# Patient Record
Sex: FEMALE | Race: WHITE | NOT HISPANIC OR LATINO | URBAN - METROPOLITAN AREA
[De-identification: names, ages, dates, MRNs, and addresses within clinical notes are randomized per-mention and may not be internally consistent; named-entity substitution may affect disease eponyms.]

---

## 2018-02-20 ENCOUNTER — OUTPATIENT (OUTPATIENT)
Dept: OUTPATIENT SERVICES | Facility: HOSPITAL | Age: 33
LOS: 1 days | Discharge: HOME | End: 2018-02-20

## 2018-02-20 DIAGNOSIS — N89.8 OTHER SPECIFIED NONINFLAMMATORY DISORDERS OF VAGINA: ICD-10-CM

## 2018-03-16 ENCOUNTER — INPATIENT (INPATIENT)
Facility: HOSPITAL | Age: 33
LOS: 1 days | Discharge: HOME | End: 2018-03-18
Attending: OBSTETRICS & GYNECOLOGY | Admitting: OBSTETRICS & GYNECOLOGY

## 2018-03-16 VITALS — SYSTOLIC BLOOD PRESSURE: 143 MMHG | HEART RATE: 83 BPM | DIASTOLIC BLOOD PRESSURE: 86 MMHG

## 2018-03-16 LAB
AMPHET UR-MCNC: NEGATIVE — SIGNIFICANT CHANGE UP
APPEARANCE UR: (no result)
BACTERIA # UR AUTO: (no result) /HPF
BARBITURATES UR SCN-MCNC: NEGATIVE — SIGNIFICANT CHANGE UP
BASOPHILS # BLD AUTO: 0.02 K/UL — SIGNIFICANT CHANGE UP (ref 0–0.2)
BASOPHILS NFR BLD AUTO: 0.2 % — SIGNIFICANT CHANGE UP (ref 0–1)
BENZODIAZ UR-MCNC: NEGATIVE — SIGNIFICANT CHANGE UP
BILIRUB UR-MCNC: NEGATIVE — SIGNIFICANT CHANGE UP
BLD GP AB SCN SERPL QL: SIGNIFICANT CHANGE UP
COCAINE METAB.OTHER UR-MCNC: NEGATIVE — SIGNIFICANT CHANGE UP
COLOR SPEC: YELLOW — SIGNIFICANT CHANGE UP
DIFF PNL FLD: (no result)
DRUG SCREEN 1, URINE RESULT: SIGNIFICANT CHANGE UP
EOSINOPHIL # BLD AUTO: 0.05 K/UL — SIGNIFICANT CHANGE UP (ref 0–0.7)
EOSINOPHIL NFR BLD AUTO: 0.6 % — SIGNIFICANT CHANGE UP (ref 0–8)
EPI CELLS # UR: (no result) /HPF
GLUCOSE UR QL: NEGATIVE MG/DL — SIGNIFICANT CHANGE UP
HCT VFR BLD CALC: 37.9 % — SIGNIFICANT CHANGE UP (ref 37–47)
HGB BLD-MCNC: 12.8 G/DL — SIGNIFICANT CHANGE UP (ref 12–16)
IMM GRANULOCYTES NFR BLD AUTO: 0.7 % — HIGH (ref 0.1–0.3)
KETONES UR-MCNC: NEGATIVE — SIGNIFICANT CHANGE UP
LEUKOCYTE ESTERASE UR-ACNC: (no result)
LYMPHOCYTES # BLD AUTO: 1.35 K/UL — SIGNIFICANT CHANGE UP (ref 1.2–3.4)
LYMPHOCYTES # BLD AUTO: 15.4 % — LOW (ref 20.5–51.1)
MCHC RBC-ENTMCNC: 29.2 PG — SIGNIFICANT CHANGE UP (ref 27–31)
MCHC RBC-ENTMCNC: 33.8 G/DL — SIGNIFICANT CHANGE UP (ref 32–37)
MCV RBC AUTO: 86.5 FL — SIGNIFICANT CHANGE UP (ref 81–99)
METHADONE UR-MCNC: NEGATIVE — SIGNIFICANT CHANGE UP
MONOCYTES # BLD AUTO: 0.37 K/UL — SIGNIFICANT CHANGE UP (ref 0.1–0.6)
MONOCYTES NFR BLD AUTO: 4.2 % — SIGNIFICANT CHANGE UP (ref 1.7–9.3)
NEUTROPHILS # BLD AUTO: 6.9 K/UL — HIGH (ref 1.4–6.5)
NEUTROPHILS NFR BLD AUTO: 78.9 % — HIGH (ref 42.2–75.2)
NITRITE UR-MCNC: NEGATIVE — SIGNIFICANT CHANGE UP
NRBC # BLD: 0 /100 WBCS — SIGNIFICANT CHANGE UP (ref 0–0)
OPIATES UR-MCNC: NEGATIVE — SIGNIFICANT CHANGE UP
PCP UR-MCNC: NEGATIVE — SIGNIFICANT CHANGE UP
PH UR: 6 — SIGNIFICANT CHANGE UP (ref 5–8)
PLATELET # BLD AUTO: 217 K/UL — SIGNIFICANT CHANGE UP (ref 130–400)
PROPOXYPHENE QUALITATIVE URINE RESULT: NEGATIVE — SIGNIFICANT CHANGE UP
PROT UR-MCNC: (no result) MG/DL
RBC # BLD: 4.38 M/UL — SIGNIFICANT CHANGE UP (ref 4.2–5.4)
RBC # FLD: 14.6 % — HIGH (ref 11.5–14.5)
RBC CASTS # UR COMP ASSIST: SIGNIFICANT CHANGE UP /HPF
SP GR SPEC: 1.02 — SIGNIFICANT CHANGE UP (ref 1.01–1.03)
THC UR QL: NEGATIVE — SIGNIFICANT CHANGE UP
TYPE + AB SCN PNL BLD: SIGNIFICANT CHANGE UP
UROBILINOGEN FLD QL: 1 MG/DL (ref 0.2–0.2)
WBC # BLD: 8.75 K/UL — SIGNIFICANT CHANGE UP (ref 4.8–10.8)
WBC # FLD AUTO: 8.75 K/UL — SIGNIFICANT CHANGE UP (ref 4.8–10.8)
WBC UR QL: >50 /HPF

## 2018-03-16 RX ORDER — OXYTOCIN 10 UNIT/ML
333.33 VIAL (ML) INJECTION
Qty: 20 | Refills: 0 | Status: DISCONTINUED | OUTPATIENT
Start: 2018-03-16 | End: 2018-03-17

## 2018-03-16 RX ORDER — SODIUM CHLORIDE 9 MG/ML
500 INJECTION, SOLUTION INTRAVENOUS ONCE
Qty: 0 | Refills: 0 | Status: DISCONTINUED | OUTPATIENT
Start: 2018-03-16 | End: 2018-03-17

## 2018-03-16 RX ORDER — OXYTOCIN 10 UNIT/ML
2 VIAL (ML) INJECTION
Qty: 30 | Refills: 0 | Status: DISCONTINUED | OUTPATIENT
Start: 2018-03-16 | End: 2018-03-17

## 2018-03-16 RX ORDER — METHYLDOPA 250 MG
1 TABLET ORAL
Qty: 0 | Refills: 0 | COMMUNITY

## 2018-03-16 RX ORDER — NALOXONE HYDROCHLORIDE 4 MG/.1ML
0.1 SPRAY NASAL
Qty: 0 | Refills: 0 | Status: CANCELLED | OUTPATIENT
Start: 2018-03-16 | End: 2018-03-18

## 2018-03-16 RX ORDER — SODIUM CHLORIDE 9 MG/ML
1000 INJECTION, SOLUTION INTRAVENOUS
Qty: 0 | Refills: 0 | Status: DISCONTINUED | OUTPATIENT
Start: 2018-03-16 | End: 2018-03-17

## 2018-03-16 RX ORDER — ONDANSETRON 8 MG/1
4 TABLET, FILM COATED ORAL EVERY 6 HOURS
Qty: 0 | Refills: 0 | Status: CANCELLED | OUTPATIENT
Start: 2018-03-16 | End: 2018-03-18

## 2018-03-16 RX ORDER — METHYLDOPA 250 MG
250 TABLET ORAL
Qty: 0 | Refills: 0 | Status: DISCONTINUED | OUTPATIENT
Start: 2018-03-16 | End: 2018-03-18

## 2018-03-16 RX ADMIN — Medication 2 MILLIUNIT(S)/MIN: at 11:54

## 2018-03-16 NOTE — OB PROVIDER DELIVERY SUMMARY - NSPROVIDERDELIVERYNOTE_OBGYN_ALL_OB_FT
Pt became fully dilated and pushed well over intact perineum, delivery of head in GEOVANY position ,nose and mouth bulb suctioned on perineum, followed by delivery of shoulders and body without difficulty, live female infant, APGARs 9/9, 3160gms, 3-vessel cord + placenta complete, 2nd degree perineal laceration repaired with chromic, no complications, CBR collected

## 2018-03-16 NOTE — OB PROVIDER H&P - NSHPLABSRESULTS_GEN_ALL_CORE
GTT 85/192/158/118  HbA1c 5.0% on 12/20/18    negative sequential screen  NIPTs 46xx    35w4d GA, cephalic, 3 vessel cord, ant placenta, WM 52mm, BPP 8/8, s/d ratio 2.3  34w4d GA cephalic, 3 vessel cord, ant placenta, MVP 57mm, EFW 2380g, 41%ile, normal anatomy, BPP 8/8, s/d ratio 2.4, MVP 57mm  32w4d GA breech, EFW 1840g,37%ile, 3 vessel cord, ant placenta, MVP 56mm, normal anatomy, BPP 8/8, s/d ratio 2.0  30w5d GA EFW 1622g (49%ile), breech, 3 vessel cord, ant placenta MVP 57mm, normal anatomy, BPP 8/8, s/d ratio 1.9  28w5d GA cephalic, 1328g (55%ile), 3 vessel cord, ant placenta, MVP 48mm, normal anatomic survey, BPP 8/8, MVP 48mm, s/d ratio 2.2  18w5d GA EFW 267g, transverse, 3 vessel cord, ant placenta, CL 42mm, normal anatomic survey  12w5d GA crl 64.55, nl ovaries, CL 42mm

## 2018-03-16 NOTE — OB PROVIDER H&P - ATTENDING COMMENTS
IMP: IUP @ 38 wks, Gest HTN on Aldomet, GDMA1, favorable cervix    Plan: Admit, Pitocin Induction, Pain Management, Anticipated

## 2018-03-16 NOTE — OB PROVIDER H&P - ASSESSMENT
33yo  at 38w0d GA, PIH HTN on aldomet, GDMA1, for IOL,  -admit to L&D  -continuous EFM/toco  -pain mgmt prn  -admission labs  -IV fluids  -pitocin for induction    Dr Linares aware

## 2018-03-16 NOTE — OB PROVIDER H&P - HISTORY OF PRESENT ILLNESS
33yo  at 38w0d GA presents for scheduled IOL.  Pt denies ctx, LOF, vaginal bleeding.  Reports good fetal movement.  Pt GDMA1, well controlled with diet.  Pt has h/o chronic HTN, on aldomet.  Last VE /-2 per PMD. 31yo  at 38w0d GA presents for scheduled IOL.  Pt denies ctx, LOF, vaginal bleeding.  Reports good fetal movement.  Pt GDMA1, well controlled with diet.  Pt has h/o cHTN, on aldomet, had PELs that were negative.  Denies headaches, vision changes, RUQ/epigastric pain, increased swelling of extremities.  Last VE 2/-2 per PMD.

## 2018-03-17 LAB
HCT VFR BLD CALC: 36.3 % — LOW (ref 37–47)
HGB BLD-MCNC: 12.1 G/DL — SIGNIFICANT CHANGE UP (ref 12–16)
MCHC RBC-ENTMCNC: 28.9 PG — SIGNIFICANT CHANGE UP (ref 27–31)
MCHC RBC-ENTMCNC: 33.3 G/DL — SIGNIFICANT CHANGE UP (ref 32–37)
MCV RBC AUTO: 86.6 FL — SIGNIFICANT CHANGE UP (ref 81–99)
NRBC # BLD: 0 /100 WBCS — SIGNIFICANT CHANGE UP (ref 0–0)
PLATELET # BLD AUTO: 196 K/UL — SIGNIFICANT CHANGE UP (ref 130–400)
RBC # BLD: 4.19 M/UL — LOW (ref 4.2–5.4)
RBC # FLD: 14.6 % — HIGH (ref 11.5–14.5)
T PALLIDUM AB TITR SER: NEGATIVE — SIGNIFICANT CHANGE UP
WBC # BLD: 13.05 K/UL — HIGH (ref 4.8–10.8)
WBC # FLD AUTO: 13.05 K/UL — HIGH (ref 4.8–10.8)

## 2018-03-17 RX ORDER — SIMETHICONE 80 MG/1
80 TABLET, CHEWABLE ORAL EVERY 6 HOURS
Qty: 0 | Refills: 0 | Status: DISCONTINUED | OUTPATIENT
Start: 2018-03-17 | End: 2018-03-18

## 2018-03-17 RX ORDER — MAGNESIUM HYDROXIDE 400 MG/1
30 TABLET, CHEWABLE ORAL
Qty: 0 | Refills: 0 | Status: DISCONTINUED | OUTPATIENT
Start: 2018-03-17 | End: 2018-03-18

## 2018-03-17 RX ORDER — OXYCODONE AND ACETAMINOPHEN 5; 325 MG/1; MG/1
1 TABLET ORAL EVERY 6 HOURS
Qty: 0 | Refills: 0 | Status: DISCONTINUED | OUTPATIENT
Start: 2018-03-17 | End: 2018-03-18

## 2018-03-17 RX ORDER — LANOLIN
1 OINTMENT (GRAM) TOPICAL EVERY 6 HOURS
Qty: 0 | Refills: 0 | Status: DISCONTINUED | OUTPATIENT
Start: 2018-03-17 | End: 2018-03-18

## 2018-03-17 RX ORDER — OXYTOCIN 10 UNIT/ML
41.67 VIAL (ML) INJECTION
Qty: 20 | Refills: 0 | Status: DISCONTINUED | OUTPATIENT
Start: 2018-03-17 | End: 2018-03-18

## 2018-03-17 RX ORDER — DOCUSATE SODIUM 100 MG
100 CAPSULE ORAL
Qty: 0 | Refills: 0 | Status: DISCONTINUED | OUTPATIENT
Start: 2018-03-17 | End: 2018-03-18

## 2018-03-17 RX ORDER — IBUPROFEN 200 MG
600 TABLET ORAL EVERY 6 HOURS
Qty: 0 | Refills: 0 | Status: DISCONTINUED | OUTPATIENT
Start: 2018-03-17 | End: 2018-03-18

## 2018-03-17 RX ORDER — AER TRAVELER 0.5 G/1
1 SOLUTION RECTAL; TOPICAL EVERY 4 HOURS
Qty: 0 | Refills: 0 | Status: DISCONTINUED | OUTPATIENT
Start: 2018-03-17 | End: 2018-03-18

## 2018-03-17 RX ORDER — DIPHENHYDRAMINE HCL 50 MG
25 CAPSULE ORAL EVERY 6 HOURS
Qty: 0 | Refills: 0 | Status: DISCONTINUED | OUTPATIENT
Start: 2018-03-17 | End: 2018-03-18

## 2018-03-17 RX ORDER — DIBUCAINE 1 %
1 OINTMENT (GRAM) RECTAL EVERY 4 HOURS
Qty: 0 | Refills: 0 | Status: DISCONTINUED | OUTPATIENT
Start: 2018-03-17 | End: 2018-03-18

## 2018-03-17 RX ORDER — ACETAMINOPHEN 500 MG
650 TABLET ORAL EVERY 6 HOURS
Qty: 0 | Refills: 0 | Status: DISCONTINUED | OUTPATIENT
Start: 2018-03-17 | End: 2018-03-18

## 2018-03-17 RX ADMIN — Medication 100 MILLIGRAM(S): at 17:06

## 2018-03-17 RX ADMIN — Medication 600 MILLIGRAM(S): at 01:49

## 2018-03-17 RX ADMIN — Medication 600 MILLIGRAM(S): at 23:24

## 2018-03-17 RX ADMIN — Medication 600 MILLIGRAM(S): at 18:00

## 2018-03-17 RX ADMIN — Medication 250 MILLIGRAM(S): at 06:16

## 2018-03-17 RX ADMIN — Medication 600 MILLIGRAM(S): at 06:17

## 2018-03-17 RX ADMIN — Medication 250 MILLIGRAM(S): at 17:07

## 2018-03-17 RX ADMIN — Medication 600 MILLIGRAM(S): at 23:54

## 2018-03-17 RX ADMIN — Medication 600 MILLIGRAM(S): at 17:07

## 2018-03-17 NOTE — PROGRESS NOTE ADULT - ASSESSMENT
IMP: s/p  - PPD 1 - Doing Well    Plan:  f/u CBC, Anticipated d/c - 3/18 IMP: s/p  - PPD 1 - Doing Well         Gest HTN on Aldomet, GDMA1    Plan:  f/u CBC, Continue Aldomet, Anticipated d/c - 3/18

## 2018-03-18 VITALS
SYSTOLIC BLOOD PRESSURE: 133 MMHG | HEART RATE: 73 BPM | RESPIRATION RATE: 20 BRPM | TEMPERATURE: 97 F | DIASTOLIC BLOOD PRESSURE: 80 MMHG

## 2018-03-18 RX ORDER — IBUPROFEN 200 MG
1 TABLET ORAL
Qty: 0 | Refills: 0 | COMMUNITY
Start: 2018-03-18

## 2018-03-18 RX ADMIN — Medication 600 MILLIGRAM(S): at 13:46

## 2018-03-18 RX ADMIN — Medication 250 MILLIGRAM(S): at 05:18

## 2018-03-18 NOTE — DISCHARGE NOTE OB - PLAN OF CARE
healthy patient uncomplicated vaginal delivery and postpartum course  Nothing in the vagina for 6 weeks. No tampons, no douching, no sex. No swimming, no tub-baths. May shower.If fever 100.4F or greater, excessive vaginal bleeding, or severe abdominal pain, call your Ob/Gyn or come to ED or call 911.

## 2018-03-18 NOTE — DISCHARGE NOTE OB - CARE PROVIDER_API CALL
Bryant Linares), Obstetrics and Gynecology  [address] [place] [address]  Phone: [phone]  Fax: [phone]

## 2018-03-18 NOTE — DISCHARGE NOTE OB - CARE PLAN
Principal Discharge DX:	Vaginal delivery  Goal:	healthy patient  Assessment and plan of treatment:	uncomplicated vaginal delivery and postpartum course  Nothing in the vagina for 6 weeks. No tampons, no douching, no sex. No swimming, no tub-baths. May shower.If fever 100.4F or greater, excessive vaginal bleeding, or severe abdominal pain, call your Ob/Gyn or come to ED or call 911.

## 2018-03-18 NOTE — DISCHARGE NOTE OB - MEDICATION SUMMARY - MEDICATIONS TO TAKE
I will START or STAY ON the medications listed below when I get home from the hospital:    ibuprofen 600 mg oral tablet  -- 1 tab(s) by mouth every 6 hours, As needed, Moderate Pain  -- Indication: For INDUCTION    Aldomet 250 mg oral tablet  -- 1 cap(s) by mouth 2 times a day  -- Indication: For Chronic hypertension

## 2018-03-18 NOTE — PROGRESS NOTE ADULT - SUBJECTIVE AND OBJECTIVE BOX
PGY2 note    Pt seen and examined at bedside, no complaints at this time.    T(F): 97.6  HR: 91  BP: 114/79  SpO2: --    EFM: 120/mod/+accels  [place]: q3-5    SVE: deferred, last exam /-2 @10:25    Meds:  11:56 started on pitocin, now at 10mu  13:31 epidural      Labs:                        12.8   8.75  )-----------( 217      ( 11:19 )             37.9     Urinalysis Basic - ( 16 Mar 2018 10:23 )    Color: Yellow / Appearance: Cloudy / S.020 / pH: x  Gluc: x / Ketone: Negative  / Bili: Negative / Urobili: 1.0 mg/dL   Blood: x / Protein: Trace mg/dL / Nitrite: Negative   Leuk Esterase: Large / RBC: 1-2 /HPF / WBC >50 /HPF   Sq Epi: x / Non Sq Epi: Many /HPF / Bacteria: Moderate /HPF    31yo  at 38w0d GA, GBS neg, scheduled IOL for cHTN and GDMA1 well controlled, on pitocin, with epidural,  -continuous EMF/toco  -pain mgmt prn  -c/w pitocin    Dr Lee and Dr Linares aware
Progress Note    Patient seen and examined at bedside. Comfortable, states would like epidural when contractions become closer together     T(C): 36.4 (18 @ 10:57), Max: 36.4 (18 @ 10:57)  HR: 72 (18 @ 12:36) (67 - 83)  BP: 129/84 (18 @ 12:36) (113/79 - 143/86)  RR: 18 (18 @ 10:57) (18 - 18)  SpO2: --  EFM: 120 CAT I  [place]: Q 4	  SVE: deferred    Meds: ([place])   1 Each &lt;See Task&gt; (18 @ 11:50)    oxytocin Infusion increased to 4 mu   2 mL/Hr IV Continuous (18 @ 11:51)        Labs:                        12.8   8.75  )-----------( 217      ( 16 Mar 2018 11:19 )             37.9         Urinalysis Basic - ( 16 Mar 2018 10:23 )    Color: Yellow / Appearance: Cloudy / S.020 / pH: x  Gluc: x / Ketone: Negative  / Bili: Negative / Urobili: 1.0 mg/dL   Blood: x / Protein: Trace mg/dL / Nitrite: Negative   Leuk Esterase: Large / RBC: 1-2 /HPF / WBC >50 /HPF   Sq Epi: x / Non Sq Epi: Many /HPF / Bacteria: Moderate /HPF          A/P:  31 y/o , GDMA1, GHTN on aldomet, favorable cervix for pitocin induction of labor  - continue EFM & TOCO monitoring  - analgesia prn  - continue pitocin  - iV hydration  - Dr Linares aware
Pt seen at bedside, no complaints, nursing    Vital Signs Last 24 Hrs  T(C): 36.1 (17 Mar 2018 04:21), Max: 36.4 (16 Mar 2018 10:57)  T(F): 96.9 (17 Mar 2018 04:21), Max: 97.6 (16 Mar 2018 10:57)  HR: 69 (17 Mar 2018 04:21) (66 - 115)  BP: 124/70 (17 Mar 2018 04:21) (95/56 - 143/86)  BP(mean): 103 (16 Mar 2018 23:23) (103 - 103)  RR: 18 (17 Mar 2018 04:21) (16 - 18)    Resp - CTA b/l  CVS - S1S2+, RRR  Breasts - soft, NT  Abd - soft, NTND, BS+, uterus - firm  VE - Moderate lochia, perineum- intact  Ext - No Homans    LABS                          12.8   8.75  )-----------( 217      ( 16 Mar 2018 11:19 )             37.9     O POS, Rubella - Immune, RPR - NR
Pt seen at bedside, no complaints, nursing    Vital Signs Last 24 Hrs  T(C): 36.1 (18 Mar 2018 07:56), Max: 36.4 (17 Mar 2018 15:28)  T(F): 96.9 (18 Mar 2018 07:56), Max: 97.6 (17 Mar 2018 15:28)  HR: 61 (18 Mar 2018 07:56) (59 - 76)  BP: 134/83 (18 Mar 2018 07:56) (128/79 - 135/90)  RR: 20 (18 Mar 2018 07:56) (18 - 20)    Resp - CTA b/l  CVS - S1S2+, RRR  Breasts - soft, NT  Abd - soft, NTND, BS+, uterus - firm  VE - Minimal lochia, perineum- intact  Ext - No Homans    LABS                          12.1   13.05 )-----------( 196      ( 17 Mar 2018 11:55 )             36.3

## 2018-03-18 NOTE — PROGRESS NOTE ADULT - ASSESSMENT
IMP: s/p  - PPD 2 - Doing Well          Gest HTN on Aldomet    Plan:   d/c home, continue PNV and Aldomet 250 BID, f/u office - 6 wks

## 2018-03-18 NOTE — DISCHARGE NOTE OB - NSFOLLOWUPCOMMENTS_ALL_CORE_SIUH
please follow up with Dr. Linares in 1 week for blood pressure check, then 6 weeks for postpartum check up

## 2018-03-18 NOTE — DISCHARGE NOTE OB - PATIENT PORTAL LINK FT
You can access the [facility], offered by [facility], by registering with the following website: [email or web address]

## 2018-03-20 DIAGNOSIS — Z33.1 PREGNANT STATE, INCIDENTAL: ICD-10-CM

## 2018-03-20 DIAGNOSIS — Z3A.38 38 WEEKS GESTATION OF PREGNANCY: ICD-10-CM

## 2019-08-18 NOTE — OB PROVIDER DELIVERY SUMMARY - NS_LABORDURATION_OBGYN_ALL_OB_FT
Asthma (Adult)  Asthma is a disease where the medium and  small air passages within the lung go into spasm and restrict the flow of air. Inflammation and swelling of the airways cause further blockage. During an acute asthma attack, these factors cause trouble breathing, wheezing, cough and chest tightness.    An asthma attack can be triggered by many things. Common triggers include infections such as the common cold, bronchitis, and pneumonia. Irritants such as smoke or pollutants in the air, very cold air, emotional upset, and exercise can also trigger an attack. In many adults with asthma, allergies to dust, mold, pollen and animal dander can cause an asthma attack. Skipping doses of daily asthma medicine can also bring on an asthma attack.  Asthma can be controlled using the proper medicines prescribed by your healthcare provider and avoiding exposure to known triggers including allergens and irritants.  Home care  · Take prescribed medicine exactly at the times advised. If you need medicine such as from a hand held inhaler or aerosol breathing machine more than every 4 hours, contact your healthcare provider or seek immediate medical attention. If prescribed an antibiotic or prednisone, take all of the medicine as prescribed, even if you are feeling better after a few days.  · Don't smoke. Avoid being exposed to the smoke of others.  · Some people with asthma have worsening of their symptoms when they take aspirin and non-steroidal or fever-reducing medicines like ibuprofen and naproxen. Talk to your healthcare provider if you think this may apply to you.  Follow-up care  Follow up with your healthcare provider, or as advised. Always bring all of your current medicines to any appointments with your healthcare provider. Also bring a complete list of medicines even those not taken for asthma. If you don't already have one, talk to your healthcare provider about developing your own \"Asthma Action Plan.\"  A  pneumococcal (pneumonia) vaccine and yearly flu shot (every fall) are recommended. Ask your doctor about this.  When to seek medical advice  Call your healthcare provider right away if any of these occur:   · Increased wheezing or shortness of breath  · Need to use your inhalers more often than usual without relief  · Fever of 100.4ºF (38ºC) or higher, or as directed by your healthcare provider  · Coughing up lots of dark-colored or bloody sputum (mucus)  · Chest pain with each breath  · If you use a peak flow meter as part of an Asthma Action Plan, and you are still in the yellow zone (50% to 80%) 15 minutes after using inhaler medicine.  Call 911  Call 911 if any of the following occur  · Trouble walking or talking because of shortness of breath  · If you use a peak flow meter as part of an Asthma Action Plan and you are still in the red zone (less than 50%) 15 minutes after using inhaler medicine  · Lips or fingernails turning gray or blue  Date Last Reviewed: 5/1/2017  © 5977-2403 [employer]. [address] [place] [address], [place] [address]. All rights reserved. This information is not intended as a substitute for professional medical care. Always follow your healthcare professional's instructions.         22 Hour(s) 54 Minute(s)

## 2024-11-05 NOTE — OB RN DELIVERY SUMMARY - NS_ADMITLABOR_OBGYN_ALL_OB
Progress: Weight change since last visit: 134lbs- 164.9lbs        Weight change since initial appointment: 188.4- 164.9lbs -13% total body weight             Nutrition Goal: Meeting protein goals, goal for water 64oz    Activity Goal: HIT 2 days a week, walking, weight lifting two days a week, 12,000-15,000 steps a day,  schedule with exercise physiologist      Wellness Goal:  Moderate stress, look into calm kaylan    Medication recommendations: Start Wegovy 0.25mg     Other: Labs pending from PCP    Probiotics:  Please buy refrigerated brands with at least 6-7 probiotic strains.   Recommendations are Udo Tg, VSL-3, Florastor, or Culturelle   Month 1: Wegovy 0.25 mg/0.5 ml, Inject 0.25 mg subcutaneous weekly, quantity 2 mLs /28 days  Month 2: Wegovy 0.5 mg/0.5 ml, Inject 0.5 mg subcutaneous weekly, quantity 2 mLs /28 days  Month 3: Wegovy 1 mg/0.5 ml, Inject 1 mg subcutaneous weekly, quantity 2 mLs /28 days  Month 4: Wegovy 1.7 mg/0.75 ml,  Inject 1.7 mg subcutaneous weekly, quantity 3 mLs /28 days  Month 5: Wegovy 2.4 mg/ 0.74ml, Inject 2.4 mg subcutaneous weekly thereafter, quantity 3 mLs /28 days     Excellent candidate for GLP-1 medication as this patient has tried multiple lifestyle interventions, for 6 or more months, with out sustained weight loss.      We have confirmed that there are no pregnancy plans, no history of pancreatitis, no gastroparesis, and no personal or family history of medullary thyroid tumors for this patient.      Educated on long-term nature of this medication, we do see weight regain with discontinuation of medication.  Successful weight loss with this medication begins at 5% body weight loss, but 1/3 of patients can achieve up to 25% body weight loss with this intervention.      These medications must be used in combination with proper nutrition, regular physical activity, and proper sleep and stress management. Patients are required to undergo nutrition counseling for at least 6 months  during initialization and titration of this medication.     Discussed most common side effects of nausea and constipation.  Generally nausea can be helped by having small frequent meals.  Additionally, yashira tea can be helpful in settling the stomach. If constipation occurs, begin with magnesium supplementation: magnesium 250-500 mg nightly.  Fiber supplementation such as psyllium husk can also be used.      No

## 2025-01-06 DIAGNOSIS — Z12.39 ENCOUNTER FOR OTHER SCREENING FOR MALIGNANT NEOPLASM OF BREAST: ICD-10-CM

## 2025-01-06 PROBLEM — Z00.00 ENCOUNTER FOR PREVENTIVE HEALTH EXAMINATION: Status: ACTIVE | Noted: 2025-01-06

## 2025-01-15 ENCOUNTER — NON-APPOINTMENT (OUTPATIENT)
Age: 40
End: 2025-01-15

## 2025-01-15 ENCOUNTER — APPOINTMENT (OUTPATIENT)
Dept: OBGYN | Facility: CLINIC | Age: 40
End: 2025-01-15
Payer: COMMERCIAL

## 2025-01-15 VITALS
SYSTOLIC BLOOD PRESSURE: 138 MMHG | BODY MASS INDEX: 28.32 KG/M2 | HEART RATE: 94 BPM | WEIGHT: 150 LBS | DIASTOLIC BLOOD PRESSURE: 91 MMHG | HEIGHT: 61 IN

## 2025-01-15 DIAGNOSIS — Z30.41 ENCOUNTER FOR SURVEILLANCE OF CONTRACEPTIVE PILLS: ICD-10-CM

## 2025-01-15 DIAGNOSIS — Z01.419 ENCOUNTER FOR GYNECOLOGICAL EXAMINATION (GENERAL) (ROUTINE) W/OUT ABNORMAL FINDINGS: ICD-10-CM

## 2025-01-15 DIAGNOSIS — Z78.9 OTHER SPECIFIED HEALTH STATUS: ICD-10-CM

## 2025-01-15 DIAGNOSIS — Z80.8 FAMILY HISTORY OF MALIGNANT NEOPLASM OF OTHER ORGANS OR SYSTEMS: ICD-10-CM

## 2025-01-15 PROCEDURE — 99459 PELVIC EXAMINATION: CPT

## 2025-01-15 PROCEDURE — 99386 PREV VISIT NEW AGE 40-64: CPT

## 2025-01-15 PROCEDURE — 81003 URINALYSIS AUTO W/O SCOPE: CPT | Mod: QW

## 2025-01-15 RX ORDER — NORETHINDRONE 0.35 MG/1
0.35 TABLET ORAL DAILY
Qty: 3 | Refills: 3 | Status: ACTIVE | COMMUNITY
Start: 2025-01-15 | End: 1900-01-01

## 2025-01-15 RX ORDER — NORETHINDRONE ACETATE AND ETHINYL ESTRADIOL 1.5; 3 MG/1; UG/1
TABLET ORAL
Refills: 0 | Status: ACTIVE | COMMUNITY

## 2025-01-17 LAB
C TRACH RRNA SPEC QL NAA+PROBE: NOT DETECTED
N GONORRHOEA RRNA SPEC QL NAA+PROBE: NOT DETECTED
SOURCE TP AMPLIFICATION: NORMAL

## 2025-01-21 LAB — HPV HIGH+LOW RISK DNA PNL CVX: NOT DETECTED

## 2025-06-02 ENCOUNTER — RX RENEWAL (OUTPATIENT)
Age: 40
End: 2025-06-02

## 2025-06-02 RX ORDER — NORETHINDRONE 0.35 MG/1
0.35 TABLET ORAL
Qty: 84 | Refills: 3 | Status: ACTIVE | COMMUNITY
Start: 2025-06-02 | End: 1900-01-01

## 2025-06-24 ENCOUNTER — NON-APPOINTMENT (OUTPATIENT)
Age: 40
End: 2025-06-24

## 2025-06-25 ENCOUNTER — NON-APPOINTMENT (OUTPATIENT)
Age: 40
End: 2025-06-25

## 2025-07-02 ENCOUNTER — NON-APPOINTMENT (OUTPATIENT)
Age: 40
End: 2025-07-02

## 2025-07-23 ENCOUNTER — NON-APPOINTMENT (OUTPATIENT)
Age: 40
End: 2025-07-23